# Patient Record
Sex: MALE | Race: WHITE | NOT HISPANIC OR LATINO | URBAN - METROPOLITAN AREA
[De-identification: names, ages, dates, MRNs, and addresses within clinical notes are randomized per-mention and may not be internally consistent; named-entity substitution may affect disease eponyms.]

---
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
## 2019-03-10 ENCOUNTER — HOSPITAL ENCOUNTER (EMERGENCY)
Dept: HOSPITAL 25 - UCCORT | Age: 19
Discharge: HOME | End: 2019-03-10
Payer: COMMERCIAL

## 2019-03-10 VITALS — DIASTOLIC BLOOD PRESSURE: 59 MMHG | SYSTOLIC BLOOD PRESSURE: 142 MMHG

## 2019-03-10 DIAGNOSIS — J45.909: ICD-10-CM

## 2019-03-10 DIAGNOSIS — Z79.899: ICD-10-CM
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
## 2019-03-10 DIAGNOSIS — S93.401A: Primary | ICD-10-CM

## 2019-03-10 PROCEDURE — 99202 OFFICE O/P NEW SF 15 MIN: CPT

## 2019-03-10 PROCEDURE — G0463 HOSPITAL OUTPT CLINIC VISIT: HCPCS

## 2019-03-10 NOTE — UC
Lower Extremity/Ankle HPI





- HPI Summary


HPI Summary: 


18-year-old male presents with complaints of right ankle pain after slipping in 

the mud yesterday and causing an inversion injury.  States he was able to walk 

and bear weight immediately after the injury as well as in the clinic.  Reports 

he woke up this morning with increased pain and swelling to the lateral aspect 

of his right ankle.  Has taken ibuprofen around 10 AM this morning with some 

relief in the pain. Denies numbness or tingling.








- History of Current Complaint


Chief Complaint: UCLowerExtremity


Stated Complaint: R ANKLE INJURY


Time Seen by Provider: 03/10/19 12:16


Hx Obtained From: Patient


Pain Intensity: 7





- Allergies/Home Medications


Allergies/Adverse Reactions: 


 Allergies











Allergy/AdvReac Type Severity Reaction Status Date / Time


 


No Known Allergies Allergy   Verified 03/10/19 11:52











Home Medications: 


 Home Medications





Albuterol HFA INHALER* [Ventolin HFA Inhaler*] 2 puff INH Q4H PRN 03/10/19 [

History Confirmed 03/10/19]


Ibuprofen TAB* [Advil TAB*] 200 mg PO Q6H PRN 03/10/19 [History Confirmed 03/10/

19]











PMH/Surg Hx/FS Hx/Imm Hx


Previously Healthy: Yes


Respiratory History: Asthma





- Surgical History


Surgical History: Yes


Surgery Procedure, Year, and Place: LEFT ANKLE FX REPAIR WITH HARDWARE.  LEFT 

ANKLE REMOVAL HARDWARE





- Family History


Known Family History: Positive: Non-Contributory





- Social History


Occupation: Student


Lives: Dormitory/Roommates


Alcohol Use: None


Substance Use Type: None


Smoking Status (MU): Never Smoked Tobacco





Review of Systems


All Other Systems Reviewed And Are Negative: Yes


Constitutional: Positive: Negative


Skin: Positive: Bruising


Respiratory: Positive: Negative


Cardiovascular: Positive: Negative


Gastrointestinal: Positive: Negative


Genitourinary: Positive: Negative


Motor: Negative: Weakness


Neurovascular: Negative: Decreased Sensation


Musculoskeletal: Positive: Other: - See HPI


Neurological: Positive: Negative


Is Patient Immunocompromised?: No





Physical Exam





- Summary


Physical Exam Summary: 


GENERAL APPEARANCE: Well developed, well nourished, alert and cooperative, and 

appears to be in no acute distress.





CARDIAC: Normal S1 and S2. No S3, S4 or murmurs. Rhythm is regular. There is no 

peripheral edema, cyanosis or pallor. Extremities are warm and well perfused. 

Capillary refill is less than 2 seconds. Peripheral pulses intact.





LUNGS: Clear to auscultation without rales, rhonchi, wheezing or diminished 

breath sounds.





ABDOMEN: Positive bowel sounds. Soft, nondistended, nontender. No guarding or 

rebound. No masses or hepatosplenomegally.





MUSKULOSKELETAL: Normal muscular development.





EXTREMITIES: Tenderness with ecchymosis and and moderate edema to the lateral 

maleolus of the right ankle. Sensation and circulation intact distally.





SKIN: Skin normal color, texture and turgor with no lesions or eruptions.








Vital Signs: 


 Initial Vital Signs











Temp  97.5 F   03/10/19 11:54


 


Pulse  82   03/10/19 11:54


 


Resp  16   03/10/19 11:54


 


BP  142/59   03/10/19 11:54


 


Pulse Ox  100   03/10/19 11:54














Diagnostics





- Radiology


  ** No standard instances


Radiology Interpretation Completed By: Radiologist


Summary of Radiographic Findings: Order Information: ANKLE RIGHT 3+VWS.  

Accession Number: [id number].  CPT: 50179.  Indication: RIGHT ankle pain 

following inversion injury. Edema. Attention lateral.  malleolus.  Comparison: 

No relevant prior exams available on the [facility] for comparison.  Technique: AP

, mortise, and lateral views RIGHT ankle.  REPORT AND IMPRESSION:  #. Chronic 

appearing accessory ossicle or old avulsion fragment inferior to the lateral 

malleolus measuring 0.7 cm.  #. No acute fracture, osteochondral lesion, or 

articular malalignment.  #. Moderately severe soft tissue swelling over the 

lateral malleolus.  #. Os peroneum accessory ossicle noted.





Lower Extremity Course/Dx





- Course


Course Of Treatment: 18-year-old male presents with complaints of right ankle 

pain after slipping in the mud yesterday and causing an inversion injury.  

States he was able to walk and bear weight immediately after the injury as well 

as in the clinic.  Reports he woke up this morning with increased pain and 

swelling to the lateral aspect of his right ankle.  Has taken ibuprofen around 

10 AM this morning with some relief in the pain. Denies numbness or tingling.  

Afebrile.  Vital signs stable.  Exam reveals a young adult male in no acute 

distress with tenderness over the lateral malleolus of the right ankle with 

ecchymosis and moderate edema.  Sensation and circulation intact distally.  X-

ray shows chronic appearing accessory ossicle or old avulsion fragment inferior 

to the lateral malleolus but no acute fracture.  We will treat conservatively 

for a right ankle sprain including NSAIDs and RICE.  He is to follow-up with 

orthopedic surgery in 7 days if symptoms do not improve.  Anticipatory guidance 

and warning symptoms were reviewed with the patient.  Verbalizes understanding 

and agrees with plan of care.





- Differential Dx/Diagnosis


Differential Diagnosis/HQI/PQRI: Contusion, Fracture (Closed), Sprain


Provider Diagnosis: 


 Right ankle sprain








Discharge





- Sign-Out/Discharge


Documenting (check all that apply): Patient Departure


All imaging exams completed and their final reports reviewed: Yes





- Discharge Plan


Condition: Stable


Disposition: HOME


Prescriptions: 


Ibuprofen TAB* [Motrin TAB* 600 MG] 600 mg PO Q8H PRN #30 tab


 PRN Reason: Pain


Patient Education Materials:  Ankle Sprain (ED)


Forms:  *School Release


Referrals: 


No Primary Care Phys,NOPCP [Primary Care Provider] - 


Carlos Franklin MD [Medical Doctor] - 


Additional Instructions: 


The x-ray performed in the clinic today showed what appears to be an old injury 

to the ankle but no new fracture. I suspect that you have a sprain of the ankle.





Rest the ankle as much as possible. You may continue to walk and bear weight as 

tolerated.





Apply ice to the ankle for 15-20 minutes at least 4 times a day to help reduce 

swelling.





Use the ACE wrap to provide some compression to help reduce swelling.





Keep the foot elevated while sitting to reduce swelling.





Take ibuprofen 600 mg every 8 hours as needed for pain.





Follow up with Dr. Franklin, orthopedic surgery, in 7 days if no improvement in 

your symptoms. Call for appointment.





Seek immediate medical attention in the emergency room if you have severe pain 

not managed with pain medication, are unable to walk or bear weight, develop 

numbness or tingling in the foot or toes, or any worsening of symptoms.





- Billing Disposition and Condition


Condition: STABLE


Disposition: Home

## 2019-06-17 PROBLEM — Z00.00 ENCOUNTER FOR PREVENTIVE HEALTH EXAMINATION: Status: ACTIVE | Noted: 2019-06-17

## 2019-06-27 ENCOUNTER — APPOINTMENT (OUTPATIENT)
Dept: PEDIATRIC CARDIOLOGY | Facility: CLINIC | Age: 19
End: 2019-06-27
Payer: COMMERCIAL

## 2019-06-27 DIAGNOSIS — Z83.49 FAMILY HISTORY OF OTHER ENDOCRINE, NUTRITIONAL AND METABOLIC DISEASES: ICD-10-CM

## 2019-06-27 DIAGNOSIS — Z82.49 FAMILY HISTORY OF ISCHEMIC HEART DISEASE AND OTHER DISEASES OF THE CIRCULATORY SYSTEM: ICD-10-CM

## 2019-06-27 DIAGNOSIS — Z78.9 OTHER SPECIFIED HEALTH STATUS: ICD-10-CM

## 2019-06-27 DIAGNOSIS — Z87.898 PERSONAL HISTORY OF OTHER SPECIFIED CONDITIONS: ICD-10-CM

## 2019-06-27 DIAGNOSIS — R01.1 CARDIAC MURMUR, UNSPECIFIED: ICD-10-CM

## 2019-06-27 DIAGNOSIS — Z83.3 FAMILY HISTORY OF DIABETES MELLITUS: ICD-10-CM

## 2019-06-27 DIAGNOSIS — J45.909 UNSPECIFIED ASTHMA, UNCOMPLICATED: ICD-10-CM

## 2019-06-27 PROCEDURE — 93000 ELECTROCARDIOGRAM COMPLETE: CPT

## 2019-06-27 PROCEDURE — 93325 DOPPLER ECHO COLOR FLOW MAPG: CPT

## 2019-06-27 PROCEDURE — 93320 DOPPLER ECHO COMPLETE: CPT

## 2019-06-27 PROCEDURE — 99204 OFFICE O/P NEW MOD 45 MIN: CPT | Mod: 25

## 2019-06-27 PROCEDURE — 93303 ECHO TRANSTHORACIC: CPT

## 2019-06-27 PROCEDURE — ZZZZZ: CPT

## 2019-06-27 RX ORDER — ALBUTEROL SULFATE 90 UG/1
INHALANT RESPIRATORY (INHALATION)
Refills: 0 | Status: ACTIVE | COMMUNITY

## 2019-06-27 NOTE — CONSULT LETTER
[Today's Date] : [unfilled] [Name] : Name: [unfilled] [] : : ~~ [Dear  ___:] : Dear Dr. [unfilled]: [Today's Date:] : [unfilled] [Consult] : I had the pleasure of evaluating your patient, [unfilled]. My full evaluation follows. [Sincerely,] : Sincerely, [Consult - Single Provider] : Thank you very much for allowing me to participate in the care of this patient. If you have any questions, please do not hesitate to contact me. [FreeTextEntry4] : Guerrero Chester MD [FreeTextEntry5] : [id number] [FreeTextEntry6] : [place] [address] [de-identified] : Amna Gann MD, FACC, FASDEBBI, FAAP\par Pediatric Cardiologist\par [facility]\par

## 2019-06-27 NOTE — PHYSICAL EXAM
[General Appearance - Alert] : alert [General Appearance - In No Acute Distress] : in no acute distress [General Appearance - Well Nourished] : well nourished [General Appearance - Well Developed] : well developed [General Appearance - Well-Appearing] : well appearing [Appearance Of Head] : the head was normocephalic [Facies] : there were no dysmorphic facial features [Sclera] : the conjunctiva were normal [Outer Ear] : the ears and nose were normal in appearance [Examination Of The Oral Cavity] : mucous membranes were moist and pink [Auscultation Breath Sounds / Voice Sounds] : breath sounds clear to auscultation bilaterally [Normal Chest Appearance] : the chest was normal in appearance [Chest Palpation Tender Sternum] : no chest wall tenderness [Apical Impulse] : quiet precordium with normal apical impulse [Heart Rate And Rhythm] : normal heart rate and rhythm [Heart Sounds] : normal S1 and S2 [Heart Sounds Gallop] : no gallops [Heart Sounds Pericardial Friction Rub] : no pericardial rub [Heart Sounds Click] : no clicks [Arterial Pulses] : normal upper and lower extremity pulses with no pulse delay [Capillary Refill Test] : normal capillary refill [Edema] : no edema [Systolic] : systolic [I] : a grade 1/6  [LLSB] : LLSB  [Ejection] : ejection [Low] : low pitched [Vibratory] : vibratory [Mid] : mid [Base] : the murmur was transmitted to the base [No Diastolic Murmur] : no diastolic murmur was heard [Bowel Sounds] : normal bowel sounds [Abdomen Soft] : soft [Nondistended] : nondistended [Abdomen Tenderness] : non-tender [Musculoskeletal Exam: Normal Movement Of All Extremities] : normal movements of all extremities [Musculoskeletal - Swelling] : no joint swelling seen [Musculoskeletal - Tenderness] : no joint tenderness was elicited [Nail Clubbing] : no clubbing  or cyanosis of the fingers [Motor Tone] : muscle strength and tone were normal [Cervical Lymph Nodes Enlarged Anterior] : The anterior cervical nodes were normal [Cervical Lymph Nodes Enlarged Posterior] : The posterior cervical nodes were normal [] : no rash [Skin Lesions] : no lesions [Skin Turgor] : normal turgor [Demonstrated Behavior - Infant Nonreactive To Parents] : interactive [Mood] : mood and affect were appropriate for age [Demonstrated Behavior] : normal behavior

## 2019-06-27 NOTE — DISCUSSION/SUMMARY
[FreeTextEntry1] : - In summary, SKY was referred for evaluation of elevated blood pressure. His blood pressure was elevated today, he was feeling nervous. \par - There was no evidence of a cardiac etiology of hypertension such as coarctation of the aorta. There was no evidence of left ventricular hypertrophy which may occur with long standing or severe hypertension.\par - He has an innocent Still's murmur of childhood.\par - His echocardiogram showed trivial degrees of mitral insufficiency and tricuspid insufficiency which are normal variants.\par - Specific dietary suggestions were made. He should decrease his intake of salt and caffeinated beverages. He should increase his intake of fruits, vegetables, low fat dairy and lean protein sources. \par - I recommended that he switch his exercise routine to more cardio exercise which can lower his BP, and refrain from lifting weights until his BP is under better control. He should have at least 30-60 minutes of exercise every day. \par - I suggested nephrology consultation since he was born prematurely and possibly had umbilical lines in, and also to evaluate for white coat hypertension.  \par - No restrictions are needed\par - I would like to reevaluate him in one year if the blood pressure remains elevated or sooner if there are any further cardiac concerns.\par - The family verbalized understanding, and all questions were answered. [Needs SBE Prophylaxis] : [unfilled] does not need bacterial endocarditis prophylaxis

## 2019-06-27 NOTE — HISTORY OF PRESENT ILLNESS
[FreeTextEntry1] : SKY is a 18 year old male, second born of triplets, referred for cardiac consultation due to elevated blood pressure detected 2 weeks ago at a routine pediatric visit; he was told that his BP had been increasing gradually for a few years. He was not ill or in pain at the time of that visit. He denies feeling anxious. The BP's were in the 130's-140/s range.  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 he lifts weights 3-4 days a week, and runs a mile on a treadmill 2-3 times a week.  - He reports good exercise tolerance.\par - 32 weeks gestation, BW 2#9, mechanically vent for 1-2 days, mother does not recall if UA/UV lines were placed\par - was followed by Dr Barry Goldberg during infancy\par - Asthma, uses MDI prn several times a week, triggered by allergies or exercise\par - He feels his HR increase when anxious about school test or public speaking, no other palpitations\par - There is no history of chest pain, syncope, edema, headache or urinary symptoms. \par - There has been no recent significant weight gain or weight loss.\par - He had a renal ultrasound last week, at [facility], reportedly normal. Urine test in your office was reportedly normal. He was given a prescription for blood tests. \par - He eats a healthy well balanced diet. occasional coffee. He decreased his salt intake for 2 weeks.  \par - Family history: no hypertension. \par - Triplet brother- has pulmonary stenosis improved, no treatment needed

## 2019-06-27 NOTE — REASON FOR VISIT
[Initial Evaluation] : an initial evaluation of [Patient] : patient [Mother] : mother [FreeTextEntry3] : elevated blood pressure reading

## 2019-06-27 NOTE — REVIEW OF SYSTEMS
[Feeling Poorly] : not feeling poorly (malaise) [Fever] : no fever [Wgt Loss (___ Lbs)] : no recent weight loss [Pallor] : not pale [Eye Discharge] : no eye discharge [Redness] : no redness [Change in Vision] : no change in vision [Nasal Stuffiness] : no nasal congestion [Sore Throat] : no sore throat [Earache] : no earache [Loss Of Hearing] : no hearing loss [Cyanosis] : no cyanosis [Edema] : no edema [Chest Pain] : no chest pain or discomfort [Diaphoresis] : not diaphoretic [Exercise Intolerance] : no persistence of exercise intolerance [Orthopnea] : no orthopnea [Palpitations] : no palpitations [Fast HR] : no tachycardia [Tachypnea] : not tachypneic [Wheezing] : no wheezing [Cough] : no cough [Shortness Of Breath] : not expressed as feeling short of breath [Vomiting] : no vomiting [Diarrhea] : no diarrhea [Abdominal Pain] : no abdominal pain [Decrease In Appetite] : appetite not decreased [Seizure] : no seizures [Fainting (Syncope)] : no fainting [Headache] : no headache [Dizziness] : no dizziness [Limping] : no limping [Joint Pains] : no arthralgias [Joint Swelling] : no joint swelling [Rash] : no rash [Wound problems] : no wound problems [Easy Bruising] : no tendency for easy bruising [Swollen Glands] : no lymphadenopathy [Easy Bleeding] : no ~M tendency for easy bleeding [Nosebleeds] : no epistaxis [Sleep Disturbances] : ~T no sleep disturbances [Hyperactive] : no hyperactive behavior [Depression] : no depression [Anxiety] : no anxiety [Short Stature] : short stature was not noted [Failure To Thrive] : no failure to thrive [Jitteriness] : no jitteriness [Heat/Cold Intolerance] : no temperature intolerance [Dec Urine Output] : no oliguria

## 2019-06-27 NOTE — CARDIOLOGY SUMMARY
[Today's Date] : [unfilled] [FreeTextEntry1] : Sinus bradycardia with sinus arrhythmia@ 59 bpm. Atrial and ventricular forces were normal. No ST segment or T-wave abnormality.  ms; QRS 94 ms;  QTc 392 ms\par  [FreeTextEntry2] : Normal intracardiac anatomy. Trivial tricuspid insufficiency with a peak gradient of 24  mm Hg, which reflects normal RV pressure. Trivial mitral insufficiency. LV dimensions and shortening fraction were normal.  No pericardial effusion.

## 2019-08-14 ENCOUNTER — APPOINTMENT (OUTPATIENT)
Dept: NEPHROLOGY | Facility: CLINIC | Age: 19
End: 2019-08-14
Payer: COMMERCIAL

## 2019-08-14 VITALS
SYSTOLIC BLOOD PRESSURE: 138 MMHG | HEART RATE: 62 BPM | OXYGEN SATURATION: 99 % | DIASTOLIC BLOOD PRESSURE: 76 MMHG | HEIGHT: 69 IN | BODY MASS INDEX: 27.11 KG/M2 | WEIGHT: 183 LBS

## 2019-08-14 DIAGNOSIS — R03.0 ELEVATED BLOOD-PRESSURE READING, W/OUT DIAGNOSIS OF HYPERTENSION: ICD-10-CM

## 2019-08-14 PROCEDURE — 99205 OFFICE O/P NEW HI 60 MIN: CPT | Mod: 25

## 2019-08-14 PROCEDURE — 93784 AMBL BP MNTR W/SOFTWARE: CPT

## 2019-08-14 PROCEDURE — 36415 COLL VENOUS BLD VENIPUNCTURE: CPT

## 2019-08-14 NOTE — PHYSICAL EXAM
[General Appearance - Alert] : alert [General Appearance - In No Acute Distress] : in no acute distress [General Appearance - Well Nourished] : well nourished [General Appearance - Well Developed] : well developed [Sclera] : the sclera and conjunctiva were normal [Outer Ear] : the ears and nose were normal in appearance [Neck Appearance] : the appearance of the neck was normal [] : no respiratory distress [Respiration, Rhythm And Depth] : normal respiratory rhythm and effort [Heart Rate And Rhythm] : heart rate was normal and rhythm regular [Heart Sounds] : normal S1 and S2 [Bowel Sounds] : normal bowel sounds [Arterial Pulses Carotid] : carotid pulses were normal with no bruits [Abdomen Soft] : soft [Cervical Lymph Nodes Enlarged Posterior Bilaterally] : posterior cervical [Skin Color & Pigmentation] : normal skin color and pigmentation [Skin Turgor] : normal skin turgor [Cranial Nerves] : cranial nerves 2-12 were intact [Impaired Insight] : insight and judgment were intact [Oriented To Time, Place, And Person] : oriented to person, place, and time

## 2019-08-15 LAB
ALBUMIN SERPL ELPH-MCNC: 4.8 G/DL
ANION GAP SERPL CALC-SCNC: 12 MMOL/L
APPEARANCE: CLEAR
BACTERIA: NEGATIVE
BASOPHILS # BLD AUTO: 0.07 K/UL
BASOPHILS NFR BLD AUTO: 0.9 %
BILIRUBIN URINE: NEGATIVE
BLOOD URINE: NEGATIVE
BUN SERPL-MCNC: 19 MG/DL
CALCIUM SERPL-MCNC: 10.1 MG/DL
CHLORIDE SERPL-SCNC: 101 MMOL/L
CO2 SERPL-SCNC: 28 MMOL/L
COLOR: YELLOW
CREAT SERPL-MCNC: 1.31 MG/DL
CREAT SPEC-SCNC: 312 MG/DL
CREAT/PROT UR: 0 RATIO
EOSINOPHIL # BLD AUTO: 0.82 K/UL
EOSINOPHIL NFR BLD AUTO: 10.7 %
ESTIMATED AVERAGE GLUCOSE: 108 MG/DL
GLUCOSE QUALITATIVE U: NEGATIVE
GLUCOSE SERPL-MCNC: 86 MG/DL
HBA1C MFR BLD HPLC: 5.4 %
HCT VFR BLD CALC: 45.6 %
HGB BLD-MCNC: 14.9 G/DL
HYALINE CASTS: 1 /LPF
IMM GRANULOCYTES NFR BLD AUTO: 0.3 %
KETONES URINE: NEGATIVE
LEUKOCYTE ESTERASE URINE: NEGATIVE
LYMPHOCYTES # BLD AUTO: 2.07 K/UL
LYMPHOCYTES NFR BLD AUTO: 27.1 %
MAN DIFF?: NORMAL
MCHC RBC-ENTMCNC: 29.7 PG
MCHC RBC-ENTMCNC: 32.7 GM/DL
MCV RBC AUTO: 91 FL
MICROSCOPIC-UA: NORMAL
MONOCYTES # BLD AUTO: 0.57 K/UL
MONOCYTES NFR BLD AUTO: 7.5 %
NEUTROPHILS # BLD AUTO: 4.1 K/UL
NEUTROPHILS NFR BLD AUTO: 53.5 %
NITRITE URINE: NEGATIVE
PH URINE: 6
PHOSPHATE SERPL-MCNC: 3.8 MG/DL
PLATELET # BLD AUTO: 295 K/UL
POTASSIUM SERPL-SCNC: 4.7 MMOL/L
PROT UR-MCNC: 10 MG/DL
PROTEIN URINE: NORMAL
RBC # BLD: 5.01 M/UL
RBC # FLD: 12.8 %
RED BLOOD CELLS URINE: 5 /HPF
SODIUM SERPL-SCNC: 141 MMOL/L
SPECIFIC GRAVITY URINE: 1.03
SQUAMOUS EPITHELIAL CELLS: 0 /HPF
UROBILINOGEN URINE: NORMAL
WBC # FLD AUTO: 7.65 K/UL
WHITE BLOOD CELLS URINE: 0 /HPF

## 2019-08-15 NOTE — REVIEW OF SYSTEMS
[Negative] : Heme/Lymph [Fever] : no fever [Chills] : no chills [Eye Pain] : no eye pain [Red Eyes] : eyes not red [Earache] : no earache [Loss Of Hearing] : no hearing loss [Heart Rate Is Slow] : the heart rate was not slow [Heart Rate Is Fast] : the heart rate was not fast [Shortness Of Breath] : no shortness of breath [Wheezing] : no wheezing [Abdominal Pain] : no abdominal pain [Vomiting] : no vomiting [Arthralgias] : no arthralgias [Skin Lesions] : no skin lesions [Skin Wound] : no skin wound [Confused] : no confusion [Convulsions] : no convulsions [Proptosis] : no proptosis [Hot Flashes] : no hot flashes [Easy Bleeding] : no tendency for easy bleeding [Easy Bruising] : no tendency for easy bruising

## 2019-08-15 NOTE — HISTORY OF PRESENT ILLNESS
[FreeTextEntry1] : Patient is a 18 year old male with history of elevated BP, asthma, studies exercise science, here for initial evaluation of elevated blood pressures in the 130s-140s range. 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 No complaints; accompanied by his mother. Manual BP taken 124/70.\par \par SKY is a 18 year old male, second born of triplets, referred for cardiac consultation due to elevated blood pressure detected 2 weeks ago at a routine pediatric visit; he was told that his BP had been increasing gradually for a few years. He was not ill or in pain at the time of that visit. He denies feeling anxious. The BP's were in the 130's-140/s range. \par - He plays rugby on a club team at [employer]. he lifts weights 3-4 days a week, and runs a mile on a treadmill 2-3 times a week. - He reports good exercise tolerance.\par - 32 weeks gestation, BW 2#9, mechanically vent for 1-2 days, mother does not recall if UA/UV lines were placed\par - was followed by Dr Barry Goldberg during infancy\par - Asthma, uses MDI prn several times a week, triggered by allergies or exercise\par - He feels his HR increase when anxious about school test or public speaking, no other palpitations\par - There is no history of chest pain, syncope, edema, headache or urinary symptoms. \par - There has been no recent significant weight gain or weight loss.\par - He had a renal ultrasound last week, at [facility], reportedly normal. Urine test in your office was reportedly normal. He was given a prescription for blood tests. \par - He eats a healthy well balanced diet. occasional coffee. He decreased his salt intake for 2 weeks. \par - Family history: no hypertension. \par - Triplet brother- has pulmonary stenosis improved, no treatment needed \par

## 2019-08-15 NOTE — ASSESSMENT
[FreeTextEntry1] : 1) HTN\par 2) Asthma\par 3) Labile HTN\par 4) Increased muscle mass\par \par Will get baseline labs\par CBC, renal panel, UA, urine pro/cr ratio, A1C, \par VMA, metanephrines, 5HIAA, renin, jigna levels\par Will request sonogram from [facility], [place]; if no renal artery was checked will need to repeat doppler;\par 24 hour ABPM\par \par RTC 1 week

## 2019-08-20 ENCOUNTER — APPOINTMENT (OUTPATIENT)
Dept: NEPHROLOGY | Facility: CLINIC | Age: 19
End: 2019-08-20

## 2019-10-15 LAB
5OH-INDOLEACETATE UR-SCNC: 1.6 MG/G CREAT
ALDOSTERONE SERUM: 24.8 NG/DL
CREAT UR-MCNC: 266 MG/DL
METANEPHRINE, PL: <10 PG/ML
METANEPHS UR-SCNC: NORMAL
NORMETANEPHRINE, PL: 12 PG/ML
RENIN ACTIVITY, PLASMA: 3.33 NG/ML/HR
VMA/CREAT UR: 1.5 MG/G CR